# Patient Record
Sex: MALE | Race: BLACK OR AFRICAN AMERICAN | NOT HISPANIC OR LATINO | URBAN - METROPOLITAN AREA
[De-identification: names, ages, dates, MRNs, and addresses within clinical notes are randomized per-mention and may not be internally consistent; named-entity substitution may affect disease eponyms.]

---

## 2021-04-14 ENCOUNTER — APPOINTMENT (RX ONLY)
Dept: URBAN - METROPOLITAN AREA CLINIC 51 | Facility: CLINIC | Age: 56
Setting detail: DERMATOLOGY
End: 2021-04-14

## 2021-04-14 DIAGNOSIS — L43.8 OTHER LICHEN PLANUS: ICD-10-CM

## 2021-04-14 PROBLEM — L30.9 DERMATITIS, UNSPECIFIED: Status: ACTIVE | Noted: 2021-04-14

## 2021-04-14 PROCEDURE — ? ADDITIONAL NOTES

## 2021-04-14 PROCEDURE — ? BIOPSY BY PUNCH METHOD

## 2021-04-14 PROCEDURE — 11104 PUNCH BX SKIN SINGLE LESION: CPT

## 2021-04-14 ASSESSMENT — LOCATION DETAILED DESCRIPTION DERM: LOCATION DETAILED: LEFT CENTRAL FRONTAL SCALP

## 2021-04-14 ASSESSMENT — LOCATION SIMPLE DESCRIPTION DERM: LOCATION SIMPLE: SCALP

## 2021-04-14 ASSESSMENT — LOCATION ZONE DERM: LOCATION ZONE: SCALP

## 2021-04-14 NOTE — HPI: RASH
How Severe Is Your Rash?: mild
Is This A New Presentation, Or A Follow-Up?: Rash
Additional History: Patient states

## 2021-04-14 NOTE — PROCEDURE: ADDITIONAL NOTES
Render Risk Assessment In Note?: no
Additional Notes: There is hyperpigmentation on the scalp in a well demarcated pattern of the hair bearing skin with mild plaque like induration.  There are a few areas of hyperpigmentation on his wrist.  I would place LP, LPP, and LE in the differential dx.  Will bx the scalp to confirm.  No new treatments until bx reviewed.  He does deny recent methamphetamine use.  I would consider neurodermatitis/neuropathic if bx fails to suggest one of the other items in the differential.
Detail Level: Simple

## 2021-04-14 NOTE — PROCEDURE: BIOPSY BY PUNCH METHOD
Detail Level: Detailed
Was A Bandage Applied: Yes
Punch Size In Mm: 4
Biopsy Type: H and E
Anesthesia Type: 1% lidocaine with epinephrine
Anesthesia Volume In Cc (Will Not Render If 0): 0.3
Additional Anesthesia Volume In Cc (Will Not Render If 0): 0
Hemostasis: Pressure
Epidermal Sutures: 4-0 Nylon
Wound Care: Aquaphor
Dressing: pressure dressing
Suture Removal: 7 days
Patient Will Remove Sutures At Home?: No
Lab: [id number]
Lab Facility: [id number]
Consent: Verbal consent was obtained and risks were reviewed including but not limited to scarring, infection, bleeding, scabbing, incomplete removal, nerve damage and allergy to anesthesia.
Post-Care Instructions: I reviewed with the patient in detail post-care instructions. Patient is to keep the biopsy site dry overnight, and then apply vaseline once daily for 2 weeks. Patient may apply hydrogen peroxide soaks to remove any crusting.
Notification Instructions: Patient will RTC in 1 week to have sutures removed and review bx results.
Billing Type: Third-Party Bill
Information: Selecting Yes will display possible errors in your note based on the variables you have selected. This validation is only offered as a suggestion for you. PLEASE NOTE THAT THE VALIDATION TEXT WILL BE REMOVED WHEN YOU FINALIZE YOUR NOTE. IF YOU WANT TO FAX A PRELIMINARY NOTE YOU WILL NEED TO TOGGLE THIS TO 'NO' IF YOU DO NOT WANT IT IN YOUR FAXED NOTE.

## 2021-04-21 ENCOUNTER — APPOINTMENT (RX ONLY)
Dept: URBAN - METROPOLITAN AREA CLINIC 51 | Facility: CLINIC | Age: 56
Setting detail: DERMATOLOGY
End: 2021-04-21

## 2021-04-21 DIAGNOSIS — L81.0 POSTINFLAMMATORY HYPERPIGMENTATION: ICD-10-CM

## 2021-04-21 PROBLEM — L81.9 DISORDER OF PIGMENTATION, UNSPECIFIED: Status: ACTIVE | Noted: 2021-04-21

## 2021-04-21 PROCEDURE — ? SUTURE REMOVAL (NO GLOBAL PERIOD)

## 2021-04-21 PROCEDURE — ? ORDER TESTS

## 2021-04-21 PROCEDURE — ? PRESCRIPTION

## 2021-04-21 PROCEDURE — 99214 OFFICE O/P EST MOD 30 MIN: CPT

## 2021-04-21 PROCEDURE — ? COUNSELING

## 2021-04-21 PROCEDURE — ? TREATMENT REGIMEN

## 2021-04-21 RX ORDER — CLOBETASOL PROPIONATE 0.5 MG/G
CREAM TOPICAL
Qty: 60 | Refills: 2 | Status: ERX

## 2021-04-21 ASSESSMENT — LOCATION DETAILED DESCRIPTION DERM
LOCATION DETAILED: LEFT CENTRAL FRONTAL SCALP
LOCATION DETAILED: RIGHT SUPERIOR PARIETAL SCALP

## 2021-04-21 ASSESSMENT — LOCATION SIMPLE DESCRIPTION DERM
LOCATION SIMPLE: SCALP
LOCATION SIMPLE: LEFT SCALP

## 2021-04-21 ASSESSMENT — LOCATION ZONE DERM: LOCATION ZONE: SCALP

## 2021-04-21 ASSESSMENT — BSA RASH: BSA RASH: 4

## 2021-04-21 ASSESSMENT — SEVERITY ASSESSMENT: SEVERITY: MODERATE

## 2021-04-21 NOTE — PROCEDURE: ORDER TESTS
Bill For Surgical Tray: no
Billing Type: Third-Party Bill
Performing Laboratory: [id number]
Expected Date Of Service: 04/21/2021

## 2021-04-21 NOTE — PROCEDURE: TREATMENT REGIMEN
Detail Level: Zone
Initiate Treatment: Clobetasol cream-Apply twice daily for two weeks to affected areas on scalp, patient may repeat twice weekly for maintenance if needed.

## 2021-05-07 ENCOUNTER — APPOINTMENT (RX ONLY)
Dept: URBAN - METROPOLITAN AREA CLINIC 51 | Facility: CLINIC | Age: 56
Setting detail: DERMATOLOGY
End: 2021-05-07

## 2021-05-07 DIAGNOSIS — L738 OTHER SPECIFIED DISEASES OF HAIR AND HAIR FOLLICLES: ICD-10-CM | Status: WORSENING

## 2021-05-07 DIAGNOSIS — L663 OTHER SPECIFIED DISEASES OF HAIR AND HAIR FOLLICLES: ICD-10-CM | Status: WORSENING

## 2021-05-07 DIAGNOSIS — L73.9 FOLLICULAR DISORDER, UNSPECIFIED: ICD-10-CM | Status: WORSENING

## 2021-05-07 PROBLEM — L02.92 FURUNCLE, UNSPECIFIED: Status: ACTIVE | Noted: 2021-05-07

## 2021-05-07 PROCEDURE — ? ADDITIONAL NOTES

## 2021-05-07 PROCEDURE — ? PRESCRIPTION

## 2021-05-07 PROCEDURE — ? COUNSELING

## 2021-05-07 PROCEDURE — 99214 OFFICE O/P EST MOD 30 MIN: CPT

## 2021-05-07 RX ORDER — DOXYCYCLINE HYCLATE 100 MG/1
CAPSULE, GELATIN COATED ORAL
Qty: 60 | Refills: 2 | Status: ERX

## 2021-05-07 ASSESSMENT — PAIN INTENSITY VAS: HOW INTENSE IS YOUR PAIN 0 BEING NO PAIN, 10 BEING THE MOST SEVERE PAIN POSSIBLE?: NO PAIN

## 2021-05-07 ASSESSMENT — SEVERITY ASSESSMENT: SEVERITY: MILD

## 2021-05-07 NOTE — PROCEDURE: ADDITIONAL NOTES
Render Risk Assessment In Note?: no
Additional Notes: bx showed post inflammatory changes only.  ZANE work up and labs were negative/non-diagnostic.  There are a few perifollicular pustules present and will treat as folliculitis with doxy for one month.  I considered old LPP, DLE, LP and even burned out folliculitis declavans.  Clinically the most prominent changes are hyperpigmentation with a few scattered pustules.  If he fails to improve with doxy, I would consider a trial of plaquenil.
Detail Level: Simple

## 2021-05-10 RX ORDER — CLOBETASOL PROPIONATE 0.5 MG/G
CREAM TOPICAL
Qty: 180 | Refills: 0

## 2021-06-14 ENCOUNTER — APPOINTMENT (RX ONLY)
Dept: URBAN - METROPOLITAN AREA CLINIC 51 | Facility: CLINIC | Age: 56
Setting detail: DERMATOLOGY
End: 2021-06-14

## 2021-06-14 DIAGNOSIS — L738 OTHER SPECIFIED DISEASES OF HAIR AND HAIR FOLLICLES: ICD-10-CM | Status: WORSENING

## 2021-06-14 DIAGNOSIS — L73.9 FOLLICULAR DISORDER, UNSPECIFIED: ICD-10-CM | Status: WORSENING

## 2021-06-14 DIAGNOSIS — L663 OTHER SPECIFIED DISEASES OF HAIR AND HAIR FOLLICLES: ICD-10-CM | Status: WORSENING

## 2021-06-14 PROBLEM — L02.821 FURUNCLE OF HEAD [ANY PART, EXCEPT FACE]: Status: ACTIVE | Noted: 2021-06-14

## 2021-06-14 PROCEDURE — ? ADDITIONAL NOTES

## 2021-06-14 PROCEDURE — ? PRESCRIPTION

## 2021-06-14 PROCEDURE — 99214 OFFICE O/P EST MOD 30 MIN: CPT

## 2021-06-14 PROCEDURE — ? COUNSELING

## 2021-06-14 PROCEDURE — ? PRESCRIPTION MEDICATION MANAGEMENT

## 2021-06-14 RX ORDER — CEPHALEXIN 500 MG/1
TABLET ORAL BID
Qty: 28 | Refills: 0 | Status: ERX

## 2021-06-14 RX ORDER — PREDNISONE 20 MG/1
TABLET ORAL
Qty: 18 | Refills: 0 | Status: ERX

## 2021-06-14 ASSESSMENT — LOCATION SIMPLE DESCRIPTION DERM: LOCATION SIMPLE: POSTERIOR SCALP

## 2021-06-14 ASSESSMENT — LOCATION DETAILED DESCRIPTION DERM: LOCATION DETAILED: POSTERIOR MID-PARIETAL SCALP

## 2021-06-14 ASSESSMENT — LOCATION ZONE DERM: LOCATION ZONE: SCALP

## 2021-06-14 NOTE — PROCEDURE: ADDITIONAL NOTES
Detail Level: Simple
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  The area of pigment is confirmed to his hair bearing areas that he shaves.  I would. Put LP, LPP, LE in the differential but previous bx did not show features of these.  I cannot explain his feelings of Biting, stinging, things falling onto his clothing, running down his neck.  I do not think these represent bites or mites.
Render Risk Assessment In Note?: no

## 2021-06-14 NOTE — PROCEDURE: PRESCRIPTION MEDICATION MANAGEMENT
Initiate Treatment: Prednisone 20 mg (#60, #40, #20) x 3 days each \\nKeflex 500 mg bid x 14 days
Detail Level: Zone
Continue Regimen: Clobetasol 0.05% cream bid
Render In Strict Bullet Format?: No

## 2021-07-12 ENCOUNTER — APPOINTMENT (RX ONLY)
Dept: URBAN - METROPOLITAN AREA CLINIC 51 | Facility: CLINIC | Age: 56
Setting detail: DERMATOLOGY
End: 2021-07-12

## 2021-07-12 DIAGNOSIS — L738 OTHER SPECIFIED DISEASES OF HAIR AND HAIR FOLLICLES: ICD-10-CM | Status: UNCHANGED

## 2021-07-12 DIAGNOSIS — L73.9 FOLLICULAR DISORDER, UNSPECIFIED: ICD-10-CM | Status: UNCHANGED

## 2021-07-12 DIAGNOSIS — L663 OTHER SPECIFIED DISEASES OF HAIR AND HAIR FOLLICLES: ICD-10-CM | Status: UNCHANGED

## 2021-07-12 PROBLEM — L02.821 FURUNCLE OF HEAD [ANY PART, EXCEPT FACE]: Status: ACTIVE | Noted: 2021-07-12

## 2021-07-12 PROCEDURE — ? ADDITIONAL NOTES

## 2021-07-12 PROCEDURE — 99213 OFFICE O/P EST LOW 20 MIN: CPT

## 2021-07-12 PROCEDURE — ? PRESCRIPTION

## 2021-07-12 PROCEDURE — ? PRESCRIPTION MEDICATION MANAGEMENT

## 2021-07-12 PROCEDURE — ? COUNSELING

## 2021-07-12 RX ORDER — CEPHALEXIN 500 MG/1
TABLET ORAL BID
Qty: 60 | Refills: 0 | Status: ERX

## 2021-07-12 RX ORDER — HYDROXYCHLOROQUINE SULFATE 200 MG/1
TABLET ORAL
Qty: 30 | Refills: 0 | Status: ERX

## 2021-07-12 ASSESSMENT — LOCATION DETAILED DESCRIPTION DERM: LOCATION DETAILED: POSTERIOR MID-PARIETAL SCALP

## 2021-07-12 ASSESSMENT — LOCATION SIMPLE DESCRIPTION DERM: LOCATION SIMPLE: POSTERIOR SCALP

## 2021-07-12 ASSESSMENT — LOCATION ZONE DERM: LOCATION ZONE: SCALP

## 2021-07-12 NOTE — PROCEDURE: PRESCRIPTION MEDICATION MANAGEMENT
Detail Level: Zone
Render In Strict Bullet Format?: No
Initiate Treatment: Plaquenil 200 mg qd x 30 days \\nKeflex 500 mg bid x 30 days

## 2021-07-12 NOTE — PROCEDURE: ADDITIONAL NOTES
Additional Notes: Patient declined gabapentin because it made him feel like he had bugs crawling on him.
Detail Level: Simple
Render Risk Assessment In Note?: no
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  His feelings or crawling,  being aggravated by the lights are not as consistent with this hx.  I do not think it is parasitic or mites in nature.  LP, LPP, and LE are in the differential dx.  Will add low dose plaquenil for a one month trial.  ALso consider zoloft, doxepin or amitryptilene since he has failed gabapentin.

## 2021-08-11 ENCOUNTER — APPOINTMENT (RX ONLY)
Dept: URBAN - METROPOLITAN AREA CLINIC 51 | Facility: CLINIC | Age: 56
Setting detail: DERMATOLOGY
End: 2021-08-11

## 2021-08-11 DIAGNOSIS — L663 OTHER SPECIFIED DISEASES OF HAIR AND HAIR FOLLICLES: ICD-10-CM | Status: IMPROVED

## 2021-08-11 DIAGNOSIS — L73.9 FOLLICULAR DISORDER, UNSPECIFIED: ICD-10-CM | Status: IMPROVED

## 2021-08-11 DIAGNOSIS — L738 OTHER SPECIFIED DISEASES OF HAIR AND HAIR FOLLICLES: ICD-10-CM | Status: IMPROVED

## 2021-08-11 PROBLEM — L02.02 FURUNCLE OF FACE: Status: ACTIVE | Noted: 2021-08-11

## 2021-08-11 PROBLEM — L02.821 FURUNCLE OF HEAD [ANY PART, EXCEPT FACE]: Status: ACTIVE | Noted: 2021-08-11

## 2021-08-11 PROCEDURE — 99213 OFFICE O/P EST LOW 20 MIN: CPT

## 2021-08-11 PROCEDURE — ? PRESCRIPTION MEDICATION MANAGEMENT

## 2021-08-11 PROCEDURE — ? ADDITIONAL NOTES

## 2021-08-11 PROCEDURE — ? PRESCRIPTION

## 2021-08-11 PROCEDURE — ? COUNSELING

## 2021-08-11 RX ORDER — CEPHALEXIN 500 MG/1
BID CAPSULE ORAL
Qty: 60 | Refills: 1 | Status: ERX

## 2021-08-11 RX ORDER — HYDROXYCHLOROQUINE SULFATE 200 MG/1
BID TABLET ORAL
Qty: 60 | Refills: 1 | Status: ERX

## 2021-08-11 ASSESSMENT — LOCATION ZONE DERM
LOCATION ZONE: FACE
LOCATION ZONE: SCALP

## 2021-08-11 ASSESSMENT — LOCATION DETAILED DESCRIPTION DERM
LOCATION DETAILED: LEFT INFERIOR OCCIPITAL SCALP
LOCATION DETAILED: RIGHT LATERAL MALAR CHEEK
LOCATION DETAILED: LEFT LATERAL MALAR CHEEK
LOCATION DETAILED: RIGHT LATERAL FOREHEAD
LOCATION DETAILED: LEFT LATERAL FOREHEAD

## 2021-08-11 ASSESSMENT — LOCATION SIMPLE DESCRIPTION DERM
LOCATION SIMPLE: LEFT CHEEK
LOCATION SIMPLE: RIGHT CHEEK
LOCATION SIMPLE: POSTERIOR SCALP
LOCATION SIMPLE: LEFT FOREHEAD
LOCATION SIMPLE: RIGHT FOREHEAD

## 2021-08-11 ASSESSMENT — SEVERITY ASSESSMENT: SEVERITY: MILD

## 2021-08-11 NOTE — PROCEDURE: PRESCRIPTION MEDICATION MANAGEMENT
Continue Regimen: Plaquenil 200mg bid and keflex 500 mg bid - both x 2 months
Render In Strict Bullet Format?: No
Detail Level: Zone
Plan: RTC in 2 months for recheck

## 2021-08-11 NOTE — PROCEDURE: ADDITIONAL NOTES
Additional Notes: Pt reports that he is improved but still with some symptoms.  He still has multiple small pustules on the scalp, cheeks and neck-primarily in the areas where he shaves (he shaves his head).  I still favor pseudo folliculitis with folliculitis in the differential dx.  LPP and LP are more remotely in my differential.
Render Risk Assessment In Note?: no
Detail Level: Simple

## 2021-11-01 ENCOUNTER — APPOINTMENT (RX ONLY)
Dept: URBAN - METROPOLITAN AREA CLINIC 51 | Facility: CLINIC | Age: 56
Setting detail: DERMATOLOGY
End: 2021-11-01

## 2021-11-01 DIAGNOSIS — L663 OTHER SPECIFIED DISEASES OF HAIR AND HAIR FOLLICLES: ICD-10-CM | Status: WORSENING

## 2021-11-01 DIAGNOSIS — L738 OTHER SPECIFIED DISEASES OF HAIR AND HAIR FOLLICLES: ICD-10-CM | Status: WORSENING

## 2021-11-01 DIAGNOSIS — L73.9 FOLLICULAR DISORDER, UNSPECIFIED: ICD-10-CM | Status: WORSENING

## 2021-11-01 PROBLEM — L02.821 FURUNCLE OF HEAD [ANY PART, EXCEPT FACE]: Status: ACTIVE | Noted: 2021-11-01

## 2021-11-01 PROBLEM — L02.02 FURUNCLE OF FACE: Status: ACTIVE | Noted: 2021-11-01

## 2021-11-01 PROCEDURE — 99214 OFFICE O/P EST MOD 30 MIN: CPT

## 2021-11-01 PROCEDURE — ? COUNSELING

## 2021-11-01 PROCEDURE — ? PRESCRIPTION MEDICATION MANAGEMENT

## 2021-11-01 PROCEDURE — ? PRESCRIPTION

## 2021-11-01 PROCEDURE — ? ADDITIONAL NOTES

## 2021-11-01 RX ORDER — CEPHALEXIN 500 MG/1
BID CAPSULE ORAL
Qty: 60 | Refills: 0 | Status: ERX

## 2021-11-01 RX ORDER — HYDROXYCHLOROQUINE SULFATE 200 MG/1
BID TABLET ORAL
Qty: 60 | Refills: 0 | Status: ERX

## 2021-11-01 RX ORDER — NALTREXONE HYDROCHLORIDE 50 MG/1
TABLET, FILM COATED ORAL
Qty: 5 | Refills: 0 | Status: ERX

## 2021-11-01 ASSESSMENT — LOCATION DETAILED DESCRIPTION DERM
LOCATION DETAILED: RIGHT SUPERIOR PARIETAL SCALP
LOCATION DETAILED: RIGHT INFERIOR POSTAURICULAR SKIN
LOCATION DETAILED: LEFT POSTERIOR EAR
LOCATION DETAILED: LEFT SUPERIOR FOREHEAD

## 2021-11-01 ASSESSMENT — LOCATION SIMPLE DESCRIPTION DERM
LOCATION SIMPLE: SCALP
LOCATION SIMPLE: LEFT EAR
LOCATION SIMPLE: POSTERIOR SCALP
LOCATION SIMPLE: LEFT FOREHEAD

## 2021-11-01 ASSESSMENT — LOCATION ZONE DERM
LOCATION ZONE: EAR
LOCATION ZONE: FACE
LOCATION ZONE: SCALP

## 2021-11-01 ASSESSMENT — PAIN INTENSITY VAS: HOW INTENSE IS YOUR PAIN 0 BEING NO PAIN, 10 BEING THE MOST SEVERE PAIN POSSIBLE?: 5/10 PAIN

## 2021-11-01 ASSESSMENT — SEVERITY ASSESSMENT: SEVERITY: MILD TO MODERATE

## 2021-11-01 NOTE — PROCEDURE: ADDITIONAL NOTES
Additional Notes: He definitely has folliculitis and I tend to favor a second dx of LPP vs LP.  DLE would be in the differential dx as well.  He has improved in my opinion with hydroxycholoroquine/cephalexin but he still complains of “crawling” sensations on his ears, in his nose and on his forehead.  He states gabapentin made that worse.  We will try low dose naltrexone (1mg per day in orange juice) as a trail for these neuropathic/psychological symptoms
Detail Level: Simple
Render Risk Assessment In Note?: no

## 2021-11-15 ENCOUNTER — APPOINTMENT (RX ONLY)
Dept: URBAN - METROPOLITAN AREA CLINIC 51 | Facility: CLINIC | Age: 56
Setting detail: DERMATOLOGY
End: 2021-11-15

## 2021-11-15 DIAGNOSIS — L663 OTHER SPECIFIED DISEASES OF HAIR AND HAIR FOLLICLES: ICD-10-CM | Status: IMPROVED

## 2021-11-15 DIAGNOSIS — L73.9 FOLLICULAR DISORDER, UNSPECIFIED: ICD-10-CM | Status: IMPROVED

## 2021-11-15 DIAGNOSIS — L738 OTHER SPECIFIED DISEASES OF HAIR AND HAIR FOLLICLES: ICD-10-CM | Status: IMPROVED

## 2021-11-15 PROBLEM — L02.821 FURUNCLE OF HEAD [ANY PART, EXCEPT FACE]: Status: ACTIVE | Noted: 2021-11-15

## 2021-11-15 PROCEDURE — 99213 OFFICE O/P EST LOW 20 MIN: CPT

## 2021-11-15 PROCEDURE — ? COUNSELING

## 2021-11-15 PROCEDURE — ? PRESCRIPTION MEDICATION MANAGEMENT

## 2021-11-15 PROCEDURE — ? PRESCRIPTION

## 2021-11-15 RX ORDER — HYDROXYCHLOROQUINE SULFATE 200 MG/1
TABLET ORAL
Qty: 60 | Refills: 2 | Status: ERX

## 2021-11-15 RX ORDER — CEPHALEXIN 500 MG/1
TABLET ORAL BID
Qty: 60 | Refills: 2 | Status: ERX

## 2021-11-15 ASSESSMENT — LOCATION SIMPLE DESCRIPTION DERM: LOCATION SIMPLE: POSTERIOR SCALP

## 2021-11-15 ASSESSMENT — SEVERITY ASSESSMENT: SEVERITY: ALMOST CLEAR

## 2021-11-15 ASSESSMENT — LOCATION ZONE DERM: LOCATION ZONE: SCALP

## 2021-11-15 ASSESSMENT — LOCATION DETAILED DESCRIPTION DERM: LOCATION DETAILED: POSTERIOR MID-PARIETAL SCALP

## 2021-11-15 NOTE — PROCEDURE: PRESCRIPTION MEDICATION MANAGEMENT
Render In Strict Bullet Format?: No
Detail Level: Zone
Continue Regimen: Plaquenil 200 mg bid \\nKeflex 500 mg bid \\nNaltrexone 1cc per day

## 2021-11-17 RX ORDER — NALTREXONE HYDROCHLORIDE 50 MG/1
TABLET, FILM COATED ORAL
Qty: 5 | Refills: 0 | Status: ERX

## 2022-01-13 ENCOUNTER — RX ONLY (OUTPATIENT)
Age: 57
Setting detail: RX ONLY
End: 2022-01-13

## 2022-01-13 ENCOUNTER — APPOINTMENT (RX ONLY)
Dept: URBAN - METROPOLITAN AREA CLINIC 51 | Facility: CLINIC | Age: 57
Setting detail: DERMATOLOGY
End: 2022-01-13

## 2022-01-13 DIAGNOSIS — L73.9 FOLLICULAR DISORDER, UNSPECIFIED: ICD-10-CM

## 2022-01-13 DIAGNOSIS — L738 OTHER SPECIFIED DISEASES OF HAIR AND HAIR FOLLICLES: ICD-10-CM

## 2022-01-13 DIAGNOSIS — L663 OTHER SPECIFIED DISEASES OF HAIR AND HAIR FOLLICLES: ICD-10-CM

## 2022-01-13 PROBLEM — L02.02 FURUNCLE OF FACE: Status: ACTIVE | Noted: 2022-01-13

## 2022-01-13 PROBLEM — L02.12 FURUNCLE OF NECK: Status: ACTIVE | Noted: 2022-01-13

## 2022-01-13 PROBLEM — L02.222 FURUNCLE OF BACK [ANY PART, EXCEPT BUTTOCK]: Status: ACTIVE | Noted: 2022-01-13

## 2022-01-13 PROCEDURE — ? ADDITIONAL NOTES

## 2022-01-13 PROCEDURE — ? COUNSELING

## 2022-01-13 PROCEDURE — 99213 OFFICE O/P EST LOW 20 MIN: CPT

## 2022-01-13 RX ORDER — NALTREXONE HYDROCHLORIDE 50 MG/1
TABLET, FILM COATED ORAL
Qty: 15 | Refills: 1 | Status: ERX

## 2022-01-13 ASSESSMENT — LOCATION SIMPLE DESCRIPTION DERM
LOCATION SIMPLE: POSTERIOR NECK
LOCATION SIMPLE: UPPER BACK
LOCATION SIMPLE: RIGHT UPPER BACK
LOCATION SIMPLE: LEFT FOREHEAD
LOCATION SIMPLE: RIGHT FOREHEAD

## 2022-01-13 ASSESSMENT — LOCATION DETAILED DESCRIPTION DERM
LOCATION DETAILED: RIGHT LATERAL TRAPEZIAL NECK
LOCATION DETAILED: LEFT FOREHEAD
LOCATION DETAILED: SUPERIOR THORACIC SPINE
LOCATION DETAILED: RIGHT FOREHEAD
LOCATION DETAILED: RIGHT SUPERIOR MEDIAL UPPER BACK
LOCATION DETAILED: LEFT LATERAL TRAPEZIAL NECK

## 2022-01-13 ASSESSMENT — LOCATION ZONE DERM
LOCATION ZONE: FACE
LOCATION ZONE: TRUNK
LOCATION ZONE: NECK

## 2022-01-13 NOTE — PROCEDURE: ADDITIONAL NOTES
Additional Notes: Patient pretty clear today.
Detail Level: Simple
Render Risk Assessment In Note?: no
Additional Notes: Pt doing better.  Overall he is much improved since initial visit.  He is no longer shaving his head and his scalp issues have resolved.  “It has now moved” to his forehead and back.  Both are clear on exam.

## 2022-04-19 RX ORDER — HYDROXYCHLOROQUINE SULFATE 200 MG/1
TABLET ORAL
Qty: 60 | Refills: 0 | Status: ERX

## 2022-04-19 RX ORDER — CEPHALEXIN 500 MG/1
TABLET ORAL BID
Qty: 60 | Refills: 0 | Status: ERX

## 2022-06-21 NOTE — PROCEDURE: PRESCRIPTION MEDICATION MANAGEMENT
regular rate and rhythm
Initiate Treatment: Naltrexone 5mg - crush all tablets, add to 8oz of orange juice and take 1cc syringe daily.
Continue Regimen: Plaquenil 200mg bid, keflex 500mg bid
Render In Strict Bullet Format?: No
Plan: RTC in 10 days to recheck
Detail Level: Zone

## 2022-09-20 ENCOUNTER — APPOINTMENT (RX ONLY)
Dept: URBAN - METROPOLITAN AREA CLINIC 51 | Facility: CLINIC | Age: 57
Setting detail: DERMATOLOGY
End: 2022-09-20

## 2022-09-20 DIAGNOSIS — L663 OTHER SPECIFIED DISEASES OF HAIR AND HAIR FOLLICLES: ICD-10-CM | Status: WORSENING

## 2022-09-20 DIAGNOSIS — L738 OTHER SPECIFIED DISEASES OF HAIR AND HAIR FOLLICLES: ICD-10-CM | Status: WORSENING

## 2022-09-20 DIAGNOSIS — L73.9 FOLLICULAR DISORDER, UNSPECIFIED: ICD-10-CM | Status: WORSENING

## 2022-09-20 PROBLEM — L02.821 FURUNCLE OF HEAD [ANY PART, EXCEPT FACE]: Status: ACTIVE | Noted: 2022-09-20

## 2022-09-20 PROBLEM — L02.02 FURUNCLE OF FACE: Status: ACTIVE | Noted: 2022-09-20

## 2022-09-20 PROCEDURE — ? COUNSELING

## 2022-09-20 PROCEDURE — ? TREATMENT REGIMEN

## 2022-09-20 PROCEDURE — ? PRESCRIPTION

## 2022-09-20 PROCEDURE — 99213 OFFICE O/P EST LOW 20 MIN: CPT

## 2022-09-20 RX ORDER — CEPHALEXIN 500 MG/1
CAPSULE ORAL
Qty: 60 | Refills: 1 | Status: ERX

## 2022-09-20 RX ORDER — TACROLIMUS 1 MG/G
OINTMENT TOPICAL
Qty: 100 | Refills: 0 | Status: ERX

## 2022-09-20 ASSESSMENT — LOCATION DETAILED DESCRIPTION DERM
LOCATION DETAILED: LEFT INFERIOR OCCIPITAL SCALP
LOCATION DETAILED: RIGHT LATERAL FOREHEAD
LOCATION DETAILED: LEFT LATERAL FOREHEAD

## 2022-09-20 ASSESSMENT — LOCATION SIMPLE DESCRIPTION DERM
LOCATION SIMPLE: LEFT FOREHEAD
LOCATION SIMPLE: POSTERIOR SCALP
LOCATION SIMPLE: RIGHT FOREHEAD

## 2022-09-20 ASSESSMENT — LOCATION ZONE DERM
LOCATION ZONE: SCALP
LOCATION ZONE: FACE

## 2022-09-20 ASSESSMENT — SEVERITY ASSESSMENT: SEVERITY: MILD

## 2022-09-20 NOTE — PROCEDURE: TREATMENT REGIMEN
Otc Regimen: Hippa cleanse
Detail Level: Zone
Initiate Treatment: Protopic \\nCephalexin
No significant past surgical history

## 2023-05-09 ENCOUNTER — APPOINTMENT (RX ONLY)
Dept: URBAN - METROPOLITAN AREA CLINIC 51 | Facility: CLINIC | Age: 58
Setting detail: DERMATOLOGY
End: 2023-05-09

## 2023-05-09 DIAGNOSIS — L738 OTHER SPECIFIED DISEASES OF HAIR AND HAIR FOLLICLES: ICD-10-CM | Status: WORSENING

## 2023-05-09 DIAGNOSIS — L73.9 FOLLICULAR DISORDER, UNSPECIFIED: ICD-10-CM | Status: WORSENING

## 2023-05-09 DIAGNOSIS — L663 OTHER SPECIFIED DISEASES OF HAIR AND HAIR FOLLICLES: ICD-10-CM | Status: WORSENING

## 2023-05-09 PROBLEM — L02.424 FURUNCLE OF LEFT UPPER LIMB: Status: ACTIVE | Noted: 2023-05-09

## 2023-05-09 PROBLEM — L02.423 FURUNCLE OF RIGHT UPPER LIMB: Status: ACTIVE | Noted: 2023-05-09

## 2023-05-09 PROBLEM — L02.821 FURUNCLE OF HEAD [ANY PART, EXCEPT FACE]: Status: ACTIVE | Noted: 2023-05-09

## 2023-05-09 PROBLEM — L02.02 FURUNCLE OF FACE: Status: ACTIVE | Noted: 2023-05-09

## 2023-05-09 PROCEDURE — ? COUNSELING

## 2023-05-09 PROCEDURE — ? PRESCRIPTION

## 2023-05-09 PROCEDURE — ? TREATMENT REGIMEN

## 2023-05-09 PROCEDURE — 99213 OFFICE O/P EST LOW 20 MIN: CPT

## 2023-05-09 RX ORDER — CLINDAMYCIN PHOSPHATE 10 MG/G
GEL TOPICAL
Qty: 60 | Refills: 0 | Status: ERX

## 2023-05-09 RX ORDER — DOXYCYCLINE HYCLATE 100 MG/1
CAPSULE, GELATIN COATED ORAL
Qty: 60 | Refills: 1 | Status: ERX

## 2023-05-09 RX ORDER — SULFACETAMIDE SODIUM, SULFUR 100; 50 MG/G; MG/G
EMULSION TOPICAL
Qty: 227 | Refills: 0 | Status: ERX

## 2023-05-09 ASSESSMENT — LOCATION DETAILED DESCRIPTION DERM
LOCATION DETAILED: LEFT POSTERIOR SHOULDER
LOCATION DETAILED: RIGHT POSTERIOR SHOULDER
LOCATION DETAILED: LEFT LATERAL FOREHEAD
LOCATION DETAILED: LEFT INFERIOR OCCIPITAL SCALP
LOCATION DETAILED: RIGHT LATERAL FOREHEAD

## 2023-05-09 ASSESSMENT — LOCATION SIMPLE DESCRIPTION DERM
LOCATION SIMPLE: RIGHT SHOULDER
LOCATION SIMPLE: LEFT SHOULDER
LOCATION SIMPLE: LEFT FOREHEAD
LOCATION SIMPLE: RIGHT FOREHEAD
LOCATION SIMPLE: POSTERIOR SCALP

## 2023-05-09 ASSESSMENT — LOCATION ZONE DERM
LOCATION ZONE: SCALP
LOCATION ZONE: FACE
LOCATION ZONE: ARM

## 2023-05-09 ASSESSMENT — SEVERITY ASSESSMENT: SEVERITY: MILD

## 2023-05-09 ASSESSMENT — BSA RASH: BSA RASH: 2

## 2023-05-10 RX ORDER — SULFACETAMIDE SODIUM, SULFUR 100; 50 MG/G; MG/G
EMULSION TOPICAL
Qty: 227 | Refills: 0 | Status: ERX

## 2023-05-10 RX ORDER — CLINDAMYCIN PHOSPHATE 10 MG/G
GEL TOPICAL
Qty: 60 | Refills: 0 | Status: ERX

## 2023-05-10 RX ORDER — DOXYCYCLINE HYCLATE 100 MG/1
CAPSULE, GELATIN COATED ORAL
Qty: 60 | Refills: 1 | Status: ERX

## 2023-07-06 RX ORDER — DOXYCYCLINE HYCLATE 100 MG/1
CAPSULE, GELATIN COATED ORAL
Qty: 60 | Refills: 5 | Status: ERX

## 2024-02-06 ENCOUNTER — APPOINTMENT (RX ONLY)
Dept: URBAN - METROPOLITAN AREA CLINIC 51 | Facility: CLINIC | Age: 59
Setting detail: DERMATOLOGY
End: 2024-02-06

## 2024-02-06 DIAGNOSIS — L73.9 FOLLICULAR DISORDER, UNSPECIFIED: ICD-10-CM | Status: WORSENING

## 2024-02-06 DIAGNOSIS — L738 OTHER SPECIFIED DISEASES OF HAIR AND HAIR FOLLICLES: ICD-10-CM | Status: WORSENING

## 2024-02-06 DIAGNOSIS — L663 OTHER SPECIFIED DISEASES OF HAIR AND HAIR FOLLICLES: ICD-10-CM | Status: WORSENING

## 2024-02-06 PROBLEM — L02.02 FURUNCLE OF FACE: Status: ACTIVE | Noted: 2024-02-06

## 2024-02-06 PROBLEM — L02.821 FURUNCLE OF HEAD [ANY PART, EXCEPT FACE]: Status: ACTIVE | Noted: 2024-02-06

## 2024-02-06 PROCEDURE — ? TREATMENT REGIMEN

## 2024-02-06 PROCEDURE — G2211 COMPLEX E/M VISIT ADD ON: HCPCS

## 2024-02-06 PROCEDURE — ? BLEACH BATH INSTRUCTIONS

## 2024-02-06 PROCEDURE — 99214 OFFICE O/P EST MOD 30 MIN: CPT

## 2024-02-06 PROCEDURE — ? COUNSELING

## 2024-02-06 PROCEDURE — ? VISIT COMPLEXITY

## 2024-02-06 PROCEDURE — ? PRESCRIPTION

## 2024-02-06 RX ORDER — SULFAMETHOXAZOLE AND TRIMETHOPRIM 800; 160 MG/1; MG/1
1 TABLET ORAL QD
Qty: 60 | Refills: 0 | Status: ERX | COMMUNITY
Start: 2024-02-06

## 2024-02-06 RX ORDER — MUPIROCIN 20 MG/G
1 OINTMENT TOPICAL QD
Qty: 22 | Refills: 0 | Status: ERX | COMMUNITY
Start: 2024-02-06

## 2024-02-06 RX ADMIN — MUPIROCIN 1: 20 OINTMENT TOPICAL at 00:00

## 2024-02-06 RX ADMIN — SULFAMETHOXAZOLE AND TRIMETHOPRIM 1: 800; 160 TABLET ORAL at 00:00

## 2024-02-06 ASSESSMENT — LOCATION DETAILED DESCRIPTION DERM
LOCATION DETAILED: NASAL SUPRATIP
LOCATION DETAILED: MID-FRONTAL SCALP

## 2024-02-06 ASSESSMENT — LOCATION SIMPLE DESCRIPTION DERM
LOCATION SIMPLE: ANTERIOR SCALP
LOCATION SIMPLE: NOSE

## 2024-02-06 ASSESSMENT — BSA RASH: BSA RASH: 10

## 2024-02-06 ASSESSMENT — LOCATION ZONE DERM
LOCATION ZONE: NOSE
LOCATION ZONE: SCALP

## 2024-02-06 ASSESSMENT — SEVERITY ASSESSMENT: SEVERITY: MILD

## 2024-02-06 NOTE — PROCEDURE: VISIT COMPLEXITY
Detail Level: Simple
Do Not Use If Visit Has Modifier 25 And Other Service Is Not A Preventive Service, Immunization, Or Annual Wellness Visit: : Visit complexity inherent to evaluation and management associated with medical care services that serve as the continuing focal point for all needed health care services and/or with medical care services that are part of ongoing care related to a patient’s single, serious, or complex chronic condition
Indication: Provided medical care services as part of ongoing care related to the patient's single, serious or complex chronic condition.

## 2024-02-06 NOTE — PROCEDURE: TREATMENT REGIMEN
Plan: Patient does not have reliable transportation
Otc Regimen: Listerine swish and spit bid x 1 month\\nBleach baths
Detail Level: Simple
Initiate Treatment: Mupirocin bid x 1 month inside nose\\nBactrim bid x 1 month

## 2024-03-05 ENCOUNTER — APPOINTMENT (RX ONLY)
Dept: URBAN - METROPOLITAN AREA CLINIC 51 | Facility: CLINIC | Age: 59
Setting detail: DERMATOLOGY
End: 2024-03-05

## 2024-03-05 DIAGNOSIS — L30.9 DERMATITIS, UNSPECIFIED: ICD-10-CM | Status: WORSENING

## 2024-03-05 PROCEDURE — ? TREATMENT REGIMEN

## 2024-03-05 PROCEDURE — ? BIOPSY BY SHAVE METHOD

## 2024-03-05 PROCEDURE — 11102 TANGNTL BX SKIN SINGLE LES: CPT

## 2024-03-05 ASSESSMENT — ITCH NUMERIC RATING SCALE: HOW SEVERE IS YOUR ITCHING?: 2

## 2024-03-05 ASSESSMENT — BSA RASH: BSA RASH: 2

## 2024-03-05 ASSESSMENT — LOCATION SIMPLE DESCRIPTION DERM: LOCATION SIMPLE: RIGHT FOREHEAD

## 2024-03-05 ASSESSMENT — LOCATION DETAILED DESCRIPTION DERM: LOCATION DETAILED: RIGHT FOREHEAD

## 2024-03-05 ASSESSMENT — PAIN INTENSITY VAS: HOW INTENSE IS YOUR PAIN 0 BEING NO PAIN, 10 BEING THE MOST SEVERE PAIN POSSIBLE?: NO PAIN

## 2024-03-05 ASSESSMENT — SEVERITY ASSESSMENT: SEVERITY: MILD

## 2024-03-05 ASSESSMENT — LOCATION ZONE DERM: LOCATION ZONE: FACE

## 2024-03-05 NOTE — PROCEDURE: BIOPSY BY SHAVE METHOD
Detail Level: Detailed
Depth Of Biopsy: dermis
Was A Bandage Applied: Yes
Size Of Lesion In Cm: 0
Biopsy Type: H and E
Biopsy Method: double edge Personna blade
Anesthesia Type: 1% lidocaine without epinephrine
Anesthesia Volume In Cc: 1
Hemostasis: Pressure
Wound Care: Aquaphor
Dressing: Band-Aid
Destruction After The Procedure: No
Type Of Destruction Used: Curettage
Lab: [id number]
Consent: Verbal consent was obtained and risks were reviewed including but not limited to scarring, infection, bleeding, scabbing, incomplete removal, nerve damage and allergy to anesthesia.
Post-Care Instructions: I reviewed with the patient in detail post-care instructions. Patient is to keep the biopsy site dry overnight, and then apply bacitracin twice daily until healed. Patient may apply hydrogen peroxide soaks to remove any crusting.
Notification Instructions: Patient will be notified of biopsy results. However, patient instructed to call the office if not contacted within 2 weeks.
Billing Type: Third-Party Bill
Information: Selecting Yes will display possible errors in your note based on the variables you have selected. This validation is only offered as a suggestion for you. PLEASE NOTE THAT THE VALIDATION TEXT WILL BE REMOVED WHEN YOU FINALIZE YOUR NOTE. IF YOU WANT TO FAX A PRELIMINARY NOTE YOU WILL NEED TO TOGGLE THIS TO 'NO' IF YOU DO NOT WANT IT IN YOUR FAXED NOTE.

## 2024-03-05 NOTE — PROCEDURE: TREATMENT REGIMEN
Detail Level: Simple
Plan: Patient has been treated for bacterial Folliculitis. Patient was on Bactrim and was advised to use Listerine and mupirocin. As well as bleach baths. Patient states they are not any better. States that he feels like he can pull something out.

## 2024-03-14 ENCOUNTER — APPOINTMENT (RX ONLY)
Dept: URBAN - METROPOLITAN AREA CLINIC 51 | Facility: CLINIC | Age: 59
Setting detail: DERMATOLOGY
End: 2024-03-14

## 2024-03-14 DIAGNOSIS — L28.1 PRURIGO NODULARIS: ICD-10-CM | Status: INADEQUATELY CONTROLLED

## 2024-03-14 PROCEDURE — ? TREATMENT REGIMEN

## 2024-03-14 PROCEDURE — 99214 OFFICE O/P EST MOD 30 MIN: CPT

## 2024-03-14 PROCEDURE — ? DUPIXENT INITIATION

## 2024-03-14 PROCEDURE — ? PRESCRIPTION

## 2024-03-14 RX ORDER — DUPILUMAB 300 MG/2ML
1 INJECTION, SOLUTION SUBCUTANEOUS QD
Qty: 1 | Refills: 0 | Status: ERX

## 2024-03-14 RX ORDER — DUPILUMAB 300 MG/2ML
INJECTION, SOLUTION SUBCUTANEOUS
Qty: 1 | Refills: 12 | Status: ERX | COMMUNITY
Start: 2024-03-14

## 2024-03-14 RX ADMIN — DUPILUMAB: 300 INJECTION, SOLUTION SUBCUTANEOUS at 00:00

## 2024-03-14 ASSESSMENT — ITCH INTENSITY: HOW SEVERE IS YOUR ITCHING?: 5

## 2024-03-14 ASSESSMENT — LOCATION DETAILED DESCRIPTION DERM
LOCATION DETAILED: RIGHT PROXIMAL RADIAL DORSAL FOREARM
LOCATION DETAILED: LEFT PROXIMAL DORSAL FOREARM

## 2024-03-14 ASSESSMENT — LOCATION ZONE DERM: LOCATION ZONE: ARM

## 2024-03-14 ASSESSMENT — LOCATION SIMPLE DESCRIPTION DERM
LOCATION SIMPLE: RIGHT FOREARM
LOCATION SIMPLE: LEFT FOREARM

## 2024-03-14 NOTE — PROCEDURE: TREATMENT REGIMEN
Initiate Treatment: Dupixent - starter dose- inject two 300mg (600 mg) SQ on day one, then go to maintenance \\nMaintenance dose - inject 300 mg SQ every two weeks (start maintenance on day 14).
Detail Level: Simple

## 2024-03-14 NOTE — PROCEDURE: DUPIXENT INITIATION
Dupixent Monitoring Guidelines: There is no laboratory monitoring requirement with Dupixent.
Is Azathioprine Contraindicated?: No
Diagnosis (Required): Atopic Dermatitis/Eczematous Dermatitis
Is Methotrexate Contraindicated?: Yes
Dupixent Dosing: 600 mg SC day 0 then 300 mg SC every other week
Pregnancy And Lactation Warning Text: There have not been adverse fetal risks in women taking Dupixent while pregnant. It is unknown if this medication is excreted in breast milk.
Detail Level: Zone

## 2024-03-18 RX ORDER — DUPILUMAB 300 MG/2ML
1 INJECTION, SOLUTION SUBCUTANEOUS QD
Qty: 2 | Refills: 0 | Status: ERX

## 2024-03-18 RX ORDER — DUPILUMAB 300 MG/2ML
INJECTION, SOLUTION SUBCUTANEOUS
Qty: 2 | Refills: 12 | Status: ERX

## 2024-09-05 RX ORDER — DUPILUMAB 300 MG/2ML
INJECTION, SOLUTION SUBCUTANEOUS
Qty: 2 | Refills: 12 | Status: ERX

## 2024-09-06 RX ORDER — DUPILUMAB 300 MG/2ML
INJECTION, SOLUTION SUBCUTANEOUS
Qty: 2 | Refills: 12 | Status: ERX

## 2024-10-09 RX ORDER — DUPILUMAB 300 MG/2ML
INJECTION, SOLUTION SUBCUTANEOUS
Qty: 2 | Refills: 12 | Status: CANCELLED

## 2024-11-26 RX ORDER — DUPILUMAB 300 MG/2ML
INJECTION, SOLUTION SUBCUTANEOUS
Qty: 2 | Refills: 0 | Status: ERX

## 2024-12-19 ENCOUNTER — APPOINTMENT (OUTPATIENT)
Dept: URBAN - METROPOLITAN AREA CLINIC 51 | Facility: CLINIC | Age: 59
Setting detail: DERMATOLOGY
End: 2024-12-19

## 2024-12-19 DIAGNOSIS — L28.1 PRURIGO NODULARIS: ICD-10-CM

## 2024-12-19 PROCEDURE — ? PRESCRIPTION

## 2024-12-19 PROCEDURE — ? COUNSELING

## 2024-12-19 PROCEDURE — 99214 OFFICE O/P EST MOD 30 MIN: CPT

## 2024-12-19 PROCEDURE — ? VISIT COMPLEXITY

## 2024-12-19 PROCEDURE — ? TREATMENT REGIMEN

## 2024-12-19 PROCEDURE — ? ORDER TESTS

## 2024-12-19 PROCEDURE — G2211 COMPLEX E/M VISIT ADD ON: HCPCS

## 2024-12-19 RX ORDER — NEMOLIZUMAB-ILTO 30 MG/100MG
1 INJECTION, POWDER, LYOPHILIZED, FOR SOLUTION SUBCUTANEOUS AS DIRECTED
Qty: 1 | Refills: 11 | Status: ACTIVE

## 2024-12-19 RX ORDER — TACROLIMUS 1 MG/G
1 OINTMENT TOPICAL BID
Qty: 60 | Refills: 0 | Status: ERX | COMMUNITY
Start: 2024-12-19

## 2024-12-19 RX ORDER — NEMOLIZUMAB-ILTO 30 MG/100MG
1 INJECTION, POWDER, LYOPHILIZED, FOR SOLUTION SUBCUTANEOUS AS DIRECTED
Qty: 2 | Refills: 0 | Status: ACTIVE

## 2024-12-19 RX ADMIN — TACROLIMUS 1: 1 OINTMENT TOPICAL at 00:00

## 2024-12-19 ASSESSMENT — ITCH INTENSITY: HOW SEVERE IS YOUR ITCHING?: 7

## 2024-12-19 NOTE — PROCEDURE: VISIT COMPLEXITY
Indication: Provided medical care services as part of ongoing care related to the patient's single, serious or complex chronic condition.
Do Not Use If Visit Has Modifier 25 And Other Service Is Not A Preventive Service, Immunization, Or Annual Wellness Visit: : Visit complexity inherent to evaluation and management associated with medical care services that serve as the continuing focal point for all needed health care services and/or with medical care services that are part of ongoing care related to a patient’s single, serious, or complex chronic condition
Detail Level: Simple
Patella fracture

## 2024-12-19 NOTE — PROCEDURE: ORDER TESTS
Performing Laboratory: [id number]
Expected Date Of Service: 12/19/2024
Bill For Surgical Tray: no
Billing Type: Third-Party Bill

## 2024-12-19 NOTE — PROCEDURE: TREATMENT REGIMEN
Initiate Treatment: TAC bid x2 weeks
Detail Level: Simple
Plan: Pt has been on Dupixent for about 5 months. However he’s had multiple surgeries and was told by his drs to quit the Dupixent. Would like to have him try the Nemluvio. Chary came in the room to do injection training.

## 2025-01-02 ENCOUNTER — RX ONLY (RX ONLY)
Age: 60
End: 2025-01-02

## 2025-01-02 RX ORDER — MOMETASONE FUROATE 1 MG/ML
THIN LAYER SOLUTION TOPICAL QD
Qty: 60 | Refills: 2 | Status: ERX | COMMUNITY
Start: 2025-01-02

## 2025-01-29 ENCOUNTER — APPOINTMENT (OUTPATIENT)
Dept: URBAN - METROPOLITAN AREA CLINIC 51 | Facility: CLINIC | Age: 60
Setting detail: DERMATOLOGY
End: 2025-01-29

## 2025-01-29 DIAGNOSIS — L85.3 XEROSIS CUTIS: ICD-10-CM | Status: WORSENING

## 2025-01-29 DIAGNOSIS — L82.0 INFLAMED SEBORRHEIC KERATOSIS: ICD-10-CM | Status: WORSENING

## 2025-01-29 DIAGNOSIS — L28.1 PRURIGO NODULARIS: ICD-10-CM | Status: IMPROVED

## 2025-01-29 PROBLEM — D48.5 NEOPLASM OF UNCERTAIN BEHAVIOR OF SKIN: Status: ACTIVE | Noted: 2025-01-29

## 2025-01-29 PROCEDURE — ? COUNSELING

## 2025-01-29 PROCEDURE — ? LIQUID NITROGEN

## 2025-01-29 PROCEDURE — ? PRESCRIPTION

## 2025-01-29 PROCEDURE — ? SHAVE REMOVAL AND DESTRUCTION

## 2025-01-29 PROCEDURE — 11311 SHAVE SKIN LESION 0.6-1.0 CM: CPT

## 2025-01-29 PROCEDURE — ? TREATMENT REGIMEN

## 2025-01-29 PROCEDURE — 99213 OFFICE O/P EST LOW 20 MIN: CPT | Mod: 25

## 2025-01-29 PROCEDURE — 17110 DESTRUCTION B9 LES UP TO 14: CPT | Mod: 59

## 2025-01-29 RX ORDER — AMMONIUM LACTATE 12 G/100G
LOTION TOPICAL QD
Qty: 400 | Refills: 5 | Status: ERX | COMMUNITY
Start: 2025-01-29

## 2025-01-29 RX ORDER — TACROLIMUS 1 MG/G
1 OINTMENT TOPICAL BID
Qty: 60 | Refills: 5 | Status: ERX

## 2025-01-29 RX ADMIN — AMMONIUM LACTATE: 12 LOTION TOPICAL at 00:00

## 2025-01-29 ASSESSMENT — LOCATION SIMPLE DESCRIPTION DERM
LOCATION SIMPLE: LEFT FOREARM
LOCATION SIMPLE: RIGHT FOREARM
LOCATION SIMPLE: RIGHT FOREHEAD
LOCATION SIMPLE: LEFT FOREHEAD

## 2025-01-29 ASSESSMENT — LOCATION DETAILED DESCRIPTION DERM
LOCATION DETAILED: RIGHT DISTAL DORSAL FOREARM
LOCATION DETAILED: LEFT SUPERIOR FOREHEAD
LOCATION DETAILED: RIGHT FOREHEAD
LOCATION DETAILED: LEFT DISTAL ULNAR DORSAL FOREARM
LOCATION DETAILED: LEFT PROXIMAL DORSAL FOREARM

## 2025-01-29 ASSESSMENT — ITCH INTENSITY: HOW SEVERE IS YOUR ITCHING?: 0

## 2025-01-29 ASSESSMENT — PAIN INTENSITY VAS: HOW INTENSE IS YOUR PAIN 0 BEING NO PAIN, 10 BEING THE MOST SEVERE PAIN POSSIBLE?: NO PAIN

## 2025-01-29 ASSESSMENT — LOCATION ZONE DERM
LOCATION ZONE: ARM
LOCATION ZONE: FACE

## 2025-01-29 NOTE — PROCEDURE: TREATMENT REGIMEN
Continue Regimen: Tacrolimus
Detail Level: Simple
Plan: Patient states that the Tacrolimus is doing good.

## 2025-01-29 NOTE — PROCEDURE: SHAVE REMOVAL AND DESTRUCTION
Detail Level: Detailed
Size Of Lesion In Cm: 0.6
Size After Destruction (Required For Destruction Billing): 0.8
Anesthesia Type: 1% lidocaine without epinephrine
Anesthesia Volume In Cc: 2
Hemostasis: Electrocautery
Cautery Type: electrodesiccation
Was Curettage Performed?: Yes
Number Of Curettages: 3
Wound Care: Aquaphor
Dressing: Band-Aid
Bill As?: Note: Bill Malignant Destruction If Path Confirms Malignant Lesion. Only Bill As Shave Removal If Path Comes Back Benign. Do Not Bill Shave Removal On Malignant Lesions.: Shave Removal
Lab: [id number]
Lab Facility: [id number]
Render Path Notes In Note?: No
Consent: Verbal consent was obtained and risks were reviewed including but not limited to scarring, infection, bleeding, scabbing, incomplete removal, nerve damage and allergy to anesthesia.
Post-Care Instructions: I reviewed with the patient in detail post-care instructions. Patient is to keep the biopsy site dry overnight, and then apply bacitracin twice daily until healed. Patient may apply hydrogen peroxide soaks to remove any crusting.
Notification Instructions: Patient will be notified of biopsy results. However, patient instructed to call the office if not contacted within 2 weeks.
Billing Type: Third-Party Bill

## 2025-01-29 NOTE — PROCEDURE: LIQUID NITROGEN
Include Z78.9 (Other Specified Conditions Influencing Health Status) As An Associated Diagnosis?: Yes
Pared With?: curette
Duration Of Freeze Thaw-Cycle (Seconds): 5-10
Consent: The patient's consent was obtained including but not limited to risks of crusting, scabbing, blistering, scarring, darker or lighter pigmentary change, recurrence, incomplete removal and infection.
Number Of Freeze-Thaw Cycles: 2 freeze-thaw cycles
Spray Paint Text: The liquid nitrogen was applied to the skin utilizing a spray paint frosting technique.
Add 52 Modifier (Optional): no
Detail Level: Detailed
Post-Care Instructions: I reviewed with the patient in detail post-care instructions. Patient is to wear sunprotection, and avoid picking at any of the treated lesions. Pt may apply Vaseline to crusted or scabbing areas.
Medical Necessity Information: It is in your best interest to select a reason for this procedure from the list below. All of these items fulfill various CMS LCD requirements except the new and changing color options.

## 2025-04-14 ENCOUNTER — APPOINTMENT (OUTPATIENT)
Dept: URBAN - METROPOLITAN AREA CLINIC 51 | Facility: CLINIC | Age: 60
Setting detail: DERMATOLOGY
End: 2025-04-14

## 2025-04-14 DIAGNOSIS — L28.1 PRURIGO NODULARIS: ICD-10-CM | Status: WORSENING

## 2025-04-14 PROCEDURE — ? PRESCRIPTION

## 2025-04-14 PROCEDURE — 99214 OFFICE O/P EST MOD 30 MIN: CPT

## 2025-04-14 PROCEDURE — ? COUNSELING

## 2025-04-14 PROCEDURE — ? TREATMENT REGIMEN

## 2025-04-14 PROCEDURE — ? ADBRY INITIATION

## 2025-04-14 RX ORDER — TRALOKINUMAB-LDRM 300 MG/2ML
INJECTION, SOLUTION SUBCUTANEOUS
Qty: 2 | Refills: 0 | Status: ERX

## 2025-04-14 RX ORDER — TRALOKINUMAB-LDRM 300 MG/2ML
1 INJECTION, SOLUTION SUBCUTANEOUS
Qty: 2 | Refills: 5 | Status: ERX | COMMUNITY
Start: 2025-04-14

## 2025-04-14 RX ORDER — TRIAMCINOLONE ACETONIDE 1 MG/G
CREAM TOPICAL BID
Qty: 80 | Refills: 5 | Status: ERX | COMMUNITY
Start: 2025-04-14

## 2025-04-14 RX ADMIN — TRALOKINUMAB-LDRM 1: 300 INJECTION, SOLUTION SUBCUTANEOUS at 00:00

## 2025-04-14 RX ADMIN — TRIAMCINOLONE ACETONIDE: 1 CREAM TOPICAL at 00:00

## 2025-04-14 ASSESSMENT — LOCATION SIMPLE DESCRIPTION DERM
LOCATION SIMPLE: ANTERIOR SCALP
LOCATION SIMPLE: LEFT FOREARM
LOCATION SIMPLE: RIGHT FOREARM

## 2025-04-14 ASSESSMENT — LOCATION ZONE DERM
LOCATION ZONE: ARM
LOCATION ZONE: SCALP

## 2025-04-14 ASSESSMENT — LOCATION DETAILED DESCRIPTION DERM
LOCATION DETAILED: LEFT PROXIMAL DORSAL FOREARM
LOCATION DETAILED: RIGHT PROXIMAL DORSAL FOREARM
LOCATION DETAILED: MID-FRONTAL SCALP

## 2025-04-14 ASSESSMENT — ITCH INTENSITY: HOW SEVERE IS YOUR ITCHING?: 9

## 2025-04-14 NOTE — PROCEDURE: ADBRY INITIATION
Diagnosis (Required): Atopic Dermatitis/Eczematous Dermatitis
Is Soriatane Contraindicated?: No
Adbry Dosing: 600 mg SC day 0 then 300 mg SC every other week
Pregnancy And Lactation Warning Text: It is unknown if this medication will adversely affect pregnancy or breast feeding.
Detail Level: Zone
Adbry Monitoring Guidelines: There is no laboratory monitoring requirement with Adbry.

## 2025-04-14 NOTE — PROCEDURE: TREATMENT REGIMEN
Detail Level: Simple
Initiate Treatment: Adbry\\nTriamcinolone
Plan: Patient states that Nemluvio was not covered and he had to stop Dupixent per pcp orders. Patient has prurigo which has most likely occurred from atopic dermatitis.

## 2025-04-23 RX ORDER — TRALOKINUMAB-LDRM 300 MG/2ML
1 INJECTION, SOLUTION SUBCUTANEOUS
Qty: 2 | Refills: 5 | Status: ERX

## 2025-04-23 RX ORDER — TRALOKINUMAB-LDRM 300 MG/2ML
INJECTION, SOLUTION SUBCUTANEOUS
Qty: 2 | Refills: 0 | Status: ERX

## 2025-08-11 ENCOUNTER — APPOINTMENT (OUTPATIENT)
Dept: URBAN - METROPOLITAN AREA CLINIC 51 | Facility: CLINIC | Age: 60
Setting detail: DERMATOLOGY
End: 2025-08-11

## 2025-08-11 DIAGNOSIS — L28.1 PRURIGO NODULARIS: ICD-10-CM | Status: WORSENING

## 2025-08-11 PROCEDURE — ? PRESCRIPTION

## 2025-08-11 PROCEDURE — ? ADBRY INITIATION

## 2025-08-11 PROCEDURE — ? TREATMENT REGIMEN

## 2025-08-11 PROCEDURE — ? COUNSELING

## 2025-08-11 RX ORDER — TRALOKINUMAB-LDRM 300 MG/2ML
1 INJECTION, SOLUTION SUBCUTANEOUS
Qty: 2 | Refills: 5 | Status: ERX

## 2025-08-11 RX ORDER — TRIAMCINOLONE ACETONIDE 1 MG/G
CREAM TOPICAL BID
Qty: 80 | Refills: 5 | Status: ERX

## 2025-08-11 RX ORDER — TRALOKINUMAB-LDRM 300 MG/2ML
1 INJECTION, SOLUTION SUBCUTANEOUS AS DIRECTED
Qty: 2 | Refills: 1 | Status: ERX

## 2025-08-11 ASSESSMENT — LOCATION SIMPLE DESCRIPTION DERM
LOCATION SIMPLE: ANTERIOR SCALP
LOCATION SIMPLE: LEFT FOREARM
LOCATION SIMPLE: RIGHT FOREARM

## 2025-08-11 ASSESSMENT — LOCATION ZONE DERM
LOCATION ZONE: SCALP
LOCATION ZONE: ARM

## 2025-08-11 ASSESSMENT — LOCATION DETAILED DESCRIPTION DERM
LOCATION DETAILED: MID-FRONTAL SCALP
LOCATION DETAILED: LEFT PROXIMAL DORSAL FOREARM
LOCATION DETAILED: RIGHT PROXIMAL DORSAL FOREARM

## 2025-08-11 ASSESSMENT — ITCH INTENSITY: HOW SEVERE IS YOUR ITCHING?: 9
